# Patient Record
Sex: MALE | Race: WHITE | Employment: FULL TIME | URBAN - METROPOLITAN AREA
[De-identification: names, ages, dates, MRNs, and addresses within clinical notes are randomized per-mention and may not be internally consistent; named-entity substitution may affect disease eponyms.]

---

## 2019-07-10 ENCOUNTER — APPOINTMENT (OUTPATIENT)
Dept: GENERAL RADIOLOGY | Facility: HOSPITAL | Age: 39
End: 2019-07-10
Payer: COMMERCIAL

## 2019-07-10 ENCOUNTER — OFF PREMISE (OUTPATIENT)
Dept: SURGERY | Age: 39
End: 2019-07-10

## 2019-07-10 ENCOUNTER — APPOINTMENT (OUTPATIENT)
Dept: CT IMAGING | Facility: HOSPITAL | Age: 39
End: 2019-07-10
Payer: COMMERCIAL

## 2019-07-10 ENCOUNTER — HOSPITAL (OUTPATIENT)
Dept: OTHER | Age: 39
End: 2019-07-10

## 2019-07-10 ENCOUNTER — HOSPITAL (OUTPATIENT)
Dept: OTHER | Age: 39
End: 2019-07-10
Attending: SURGERY

## 2019-07-10 ENCOUNTER — HOSPITAL ENCOUNTER (EMERGENCY)
Facility: HOSPITAL | Age: 39
Discharge: ANOTHER HOSPITAL | End: 2019-07-10
Payer: COMMERCIAL

## 2019-07-10 VITALS
SYSTOLIC BLOOD PRESSURE: 132 MMHG | OXYGEN SATURATION: 96 % | RESPIRATION RATE: 18 BRPM | HEART RATE: 83 BPM | DIASTOLIC BLOOD PRESSURE: 86 MMHG

## 2019-07-10 DIAGNOSIS — S22.42XA CLOSED FRACTURE OF MULTIPLE RIBS OF LEFT SIDE, INITIAL ENCOUNTER: ICD-10-CM

## 2019-07-10 DIAGNOSIS — S32.401A CLOSED DISPLACED FRACTURE OF RIGHT ACETABULUM, UNSPECIFIED PORTION OF ACETABULUM, INITIAL ENCOUNTER (HCC): Primary | ICD-10-CM

## 2019-07-10 LAB
ALBUMIN SERPL-MCNC: 3.9 G/DL (ref 3.4–5)
ALBUMIN/GLOB SERPL: 1.1 {RATIO} (ref 1–2)
ALP LIVER SERPL-CCNC: 95 U/L (ref 45–117)
ALT SERPL-CCNC: 76 U/L (ref 16–61)
ANALYZER ANC (IANC): ABNORMAL
ANION GAP SERPL CALC-SCNC: 6 MMOL/L (ref 10–20)
ANION GAP SERPL CALC-SCNC: 7 MMOL/L (ref 0–18)
ANTIBODY SCREEN: NEGATIVE
APTT PPP: 24.3 SECONDS (ref 25.4–36.1)
AST SERPL-CCNC: 32 U/L (ref 15–37)
ATRIAL RATE: 74 BPM
BASOPHILS # BLD AUTO: 0.05 X10(3) UL (ref 0–0.2)
BASOPHILS # BLD: 0 K/MCL (ref 0–0.3)
BASOPHILS NFR BLD AUTO: 0.6 %
BASOPHILS NFR BLD: 0 %
BILIRUB SERPL-MCNC: 1.2 MG/DL (ref 0.1–2)
BUN BLD-MCNC: 13 MG/DL (ref 7–18)
BUN SERPL-MCNC: 14 MG/DL (ref 6–20)
BUN/CREAT SERPL: 11.8 (ref 10–20)
BUN/CREAT SERPL: 14 (ref 7–25)
CALCIUM BLD-MCNC: 9.3 MG/DL (ref 8.5–10.1)
CALCIUM SERPL-MCNC: 9 MG/DL (ref 8.4–10.2)
CHLORIDE SERPL-SCNC: 106 MMOL/L (ref 98–112)
CHLORIDE SERPL-SCNC: 107 MMOL/L (ref 98–107)
CO2 SERPL-SCNC: 27 MMOL/L (ref 21–32)
CO2 SERPL-SCNC: 28 MMOL/L (ref 21–32)
CREAT BLD-MCNC: 1.1 MG/DL (ref 0.7–1.3)
CREAT SERPL-MCNC: 1.02 MG/DL (ref 0.67–1.17)
DEPRECATED RDW RBC AUTO: 44 FL (ref 35.1–46.3)
DIFFERENTIAL METHOD BLD: ABNORMAL
EOSINOPHIL # BLD AUTO: 0.19 X10(3) UL (ref 0–0.7)
EOSINOPHIL # BLD: 0.1 K/MCL (ref 0.1–0.5)
EOSINOPHIL NFR BLD AUTO: 2.1 %
EOSINOPHIL NFR BLD: 0 %
ERYTHROCYTE [DISTWIDTH] IN BLOOD BY AUTOMATED COUNT: 12.8 % (ref 11–15)
ERYTHROCYTE [DISTWIDTH] IN BLOOD: 12.9 % (ref 11–15)
ETHANOL SERPL-MCNC: <3 MG/DL (ref ?–3)
GLOBULIN PLAS-MCNC: 3.5 G/DL (ref 2.8–4.4)
GLUCOSE BLD-MCNC: 117 MG/DL (ref 70–99)
GLUCOSE SERPL-MCNC: 99 MG/DL (ref 65–99)
HCT VFR BLD AUTO: 49.3 % (ref 39–53)
HCT VFR BLD CALC: 48.3 % (ref 39–51)
HGB BLD-MCNC: 16.6 G/DL (ref 13–17)
HGB BLD-MCNC: 17.4 G/DL (ref 13–17.5)
IMM GRANULOCYTES # BLD AUTO: 0.09 X10(3) UL (ref 0–1)
IMM GRANULOCYTES # BLD AUTO: 0.1 K/MCL (ref 0–0.2)
IMM GRANULOCYTES NFR BLD: 1 %
IMM GRANULOCYTES NFR BLD: 1 %
INR BLD: 0.95 (ref 0.9–1.1)
LYMPHOCYTES # BLD AUTO: 3.58 X10(3) UL (ref 1–4)
LYMPHOCYTES # BLD: 1.8 K/MCL (ref 1–4.8)
LYMPHOCYTES NFR BLD AUTO: 40.2 %
LYMPHOCYTES NFR BLD: 12 %
M PROTEIN MFR SERPL ELPH: 7.4 G/DL (ref 6.4–8.2)
MCH RBC QN AUTO: 32.7 PG (ref 26–34)
MCH RBC QN AUTO: 33.3 PG (ref 26–34)
MCHC RBC AUTO-ENTMCNC: 34.4 G/DL (ref 32–36.5)
MCHC RBC AUTO-ENTMCNC: 35.3 G/DL (ref 31–37)
MCV RBC AUTO: 94.3 FL (ref 80–100)
MCV RBC AUTO: 95.3 FL (ref 78–100)
MONOCYTES # BLD AUTO: 0.61 X10(3) UL (ref 0.1–1)
MONOCYTES # BLD: 0.8 K/MCL (ref 0.3–0.9)
MONOCYTES NFR BLD AUTO: 6.9 %
MONOCYTES NFR BLD: 6 %
NEUTROPHILS # BLD AUTO: 4.38 X10 (3) UL (ref 1.5–7.7)
NEUTROPHILS # BLD AUTO: 4.38 X10(3) UL (ref 1.5–7.7)
NEUTROPHILS # BLD: 11.4 K/MCL (ref 1.8–7.7)
NEUTROPHILS NFR BLD AUTO: 49.2 %
NEUTROPHILS NFR BLD: 81 %
NEUTS SEG NFR BLD: ABNORMAL %
NRBC (NRBCRE): 0 /100 WBC
OSMOLALITY SERPL CALC.SUM OF ELEC: 291 MOSM/KG (ref 275–295)
P AXIS: 15 DEGREES
P-R INTERVAL: 106 MS
PLATELET # BLD AUTO: 228 10(3)UL (ref 150–450)
PLATELET # BLD: 242 K/MCL (ref 140–450)
POTASSIUM SERPL-SCNC: 4.1 MMOL/L (ref 3.4–5.1)
POTASSIUM SERPL-SCNC: 4.1 MMOL/L (ref 3.5–5.1)
PSA SERPL DL<=0.01 NG/ML-MCNC: 13.1 SECONDS (ref 12.5–14.7)
Q-T INTERVAL: 370 MS
QRS DURATION: 88 MS
QTC CALCULATION (BEZET): 410 MS
R AXIS: 49 DEGREES
RBC # BLD AUTO: 5.23 X10(6)UL (ref 4.3–5.7)
RBC # BLD: 5.07 MIL/MCL (ref 4.5–5.9)
RH BLOOD TYPE: POSITIVE
SODIUM SERPL-SCNC: 137 MMOL/L (ref 135–145)
SODIUM SERPL-SCNC: 140 MMOL/L (ref 136–145)
T AXIS: 49 DEGREES
VENTRICULAR RATE: 74 BPM
WBC # BLD AUTO: 8.9 X10(3) UL (ref 4–11)
WBC # BLD: 14.1 K/MCL (ref 4.2–11)

## 2019-07-10 PROCEDURE — 96361 HYDRATE IV INFUSION ADD-ON: CPT

## 2019-07-10 PROCEDURE — 93005 ELECTROCARDIOGRAM TRACING: CPT

## 2019-07-10 PROCEDURE — 99291 CRITICAL CARE FIRST HOUR: CPT

## 2019-07-10 PROCEDURE — 85730 THROMBOPLASTIN TIME PARTIAL: CPT

## 2019-07-10 PROCEDURE — 96374 THER/PROPH/DIAG INJ IV PUSH: CPT

## 2019-07-10 PROCEDURE — 96376 TX/PRO/DX INJ SAME DRUG ADON: CPT

## 2019-07-10 PROCEDURE — 86900 BLOOD TYPING SEROLOGIC ABO: CPT

## 2019-07-10 PROCEDURE — 86850 RBC ANTIBODY SCREEN: CPT

## 2019-07-10 PROCEDURE — 80053 COMPREHEN METABOLIC PANEL: CPT

## 2019-07-10 PROCEDURE — 70450 CT HEAD/BRAIN W/O DYE: CPT

## 2019-07-10 PROCEDURE — 85025 COMPLETE CBC W/AUTO DIFF WBC: CPT

## 2019-07-10 PROCEDURE — 27226 TREAT HIP WALL FRACTURE: CPT | Performed by: SPECIALIST/TECHNOLOGIST, OTHER

## 2019-07-10 PROCEDURE — 72125 CT NECK SPINE W/O DYE: CPT

## 2019-07-10 PROCEDURE — 74177 CT ABD & PELVIS W/CONTRAST: CPT

## 2019-07-10 PROCEDURE — 93010 ELECTROCARDIOGRAM REPORT: CPT

## 2019-07-10 PROCEDURE — 99221 1ST HOSP IP/OBS SF/LOW 40: CPT

## 2019-07-10 PROCEDURE — 85610 PROTHROMBIN TIME: CPT

## 2019-07-10 PROCEDURE — 86901 BLOOD TYPING SEROLOGIC RH(D): CPT

## 2019-07-10 PROCEDURE — 73501 X-RAY EXAM HIP UNI 1 VIEW: CPT

## 2019-07-10 PROCEDURE — 71260 CT THORAX DX C+: CPT

## 2019-07-10 PROCEDURE — 80320 DRUG SCREEN QUANTALCOHOLS: CPT

## 2019-07-10 PROCEDURE — 96375 TX/PRO/DX INJ NEW DRUG ADDON: CPT

## 2019-07-10 RX ORDER — HYDROMORPHONE HYDROCHLORIDE 1 MG/ML
INJECTION, SOLUTION INTRAMUSCULAR; INTRAVENOUS; SUBCUTANEOUS EVERY 30 MIN PRN
Status: DISCONTINUED | OUTPATIENT
Start: 2019-07-10 | End: 2019-07-10

## 2019-07-10 RX ORDER — ONDANSETRON 2 MG/ML
4 INJECTION INTRAMUSCULAR; INTRAVENOUS ONCE
Status: COMPLETED | OUTPATIENT
Start: 2019-07-10 | End: 2019-07-10

## 2019-07-10 RX ORDER — HYDROMORPHONE HYDROCHLORIDE 1 MG/ML
1 INJECTION, SOLUTION INTRAMUSCULAR; INTRAVENOUS; SUBCUTANEOUS ONCE
Status: COMPLETED | OUTPATIENT
Start: 2019-07-10 | End: 2019-07-10
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
## 2019-07-11 ENCOUNTER — PRIOR ORIGINAL RECORDS (OUTPATIENT)
Dept: OTHER | Age: 39
End: 2019-07-11

## 2019-07-11 ENCOUNTER — OFF PREMISE (OUTPATIENT)
Dept: SURGERY | Age: 39
End: 2019-07-11

## 2019-07-11 LAB
25(OH)D3+25(OH)D2 SERPL-MCNC: 18.5 NG/ML (ref 30–100)
25(OH)D3+25(OH)D2 SERPL-MCNC: ABNORMAL NG/ML
ANALYZER ANC (IANC): ABNORMAL
ANION GAP SERPL CALC-SCNC: 8 MMOL/L (ref 10–20)
BUN SERPL-MCNC: 10 MG/DL (ref 6–20)
BUN/CREAT SERPL: 11 (ref 7–25)
CALCIUM SERPL-MCNC: 8.5 MG/DL (ref 8.4–10.2)
CHLORIDE SERPL-SCNC: 105 MMOL/L (ref 98–107)
CO2 SERPL-SCNC: 27 MMOL/L (ref 21–32)
CREAT SERPL-MCNC: 0.87 MG/DL (ref 0.67–1.17)
ERYTHROCYTE [DISTWIDTH] IN BLOOD: 12.6 % (ref 11–15)
GLUCOSE SERPL-MCNC: 115 MG/DL (ref 65–99)
HCT VFR BLD CALC: 45.2 % (ref 39–51)
HGB BLD-MCNC: 15.5 G/DL (ref 13–17)
MCH RBC QN AUTO: 32.6 PG (ref 26–34)
MCHC RBC AUTO-ENTMCNC: 34.3 G/DL (ref 32–36.5)
MCV RBC AUTO: 95 FL (ref 78–100)
NRBC (NRBCRE): 0 /100 WBC
PLATELET # BLD: 196 K/MCL (ref 140–450)
POTASSIUM SERPL-SCNC: 4.4 MMOL/L (ref 3.4–5.1)
RBC # BLD: 4.76 MIL/MCL (ref 4.5–5.9)
SODIUM SERPL-SCNC: 136 MMOL/L (ref 135–145)
WBC # BLD: 12.5 K/MCL (ref 4.2–11)

## 2019-07-11 PROCEDURE — 99233 SBSQ HOSP IP/OBS HIGH 50: CPT | Performed by: SURGERY

## 2019-07-12 ENCOUNTER — OFF PREMISE (OUTPATIENT)
Dept: SURGERY | Age: 39
End: 2019-07-12

## 2019-07-12 PROCEDURE — 99239 HOSP IP/OBS DSCHRG MGMT >30: CPT | Performed by: SURGERY

## 2019-07-29 ENCOUNTER — TELEPHONE (OUTPATIENT)
Dept: SURGERY | Age: 39
End: 2019-07-29

## 2019-08-07 PROBLEM — S22.32XA LEFT RIB FRACTURE: Status: ACTIVE | Noted: 2019-07-10
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
## 2019-08-07 PROBLEM — S32.401A RIGHT ACETABULAR FRACTURE (CMD): Status: ACTIVE | Noted: 2019-07-10

## 2019-08-08 ENCOUNTER — OFFICE VISIT (OUTPATIENT)
Dept: SURGERY | Age: 39
End: 2019-08-08

## 2019-08-08 VITALS
SYSTOLIC BLOOD PRESSURE: 109 MMHG | HEIGHT: 73 IN | WEIGHT: 230 LBS | HEART RATE: 90 BPM | DIASTOLIC BLOOD PRESSURE: 74 MMHG | BODY MASS INDEX: 30.48 KG/M2

## 2019-08-08 DIAGNOSIS — S32.421D CLOSED DISPLACED FRACTURE OF POSTERIOR WALL OF RIGHT ACETABULUM WITH ROUTINE HEALING, SUBSEQUENT ENCOUNTER: ICD-10-CM

## 2019-08-08 DIAGNOSIS — S22.42XD CLOSED FRACTURE OF MULTIPLE RIBS OF LEFT SIDE WITH ROUTINE HEALING, SUBSEQUENT ENCOUNTER: Primary | ICD-10-CM

## 2019-08-08 PROCEDURE — 99212 OFFICE O/P EST SF 10 MIN: CPT | Performed by: NURSE PRACTITIONER

## 2019-08-08 ASSESSMENT — ENCOUNTER SYMPTOMS
CONSTITUTIONAL NEGATIVE: 1
EYES NEGATIVE: 1
NEUROLOGICAL NEGATIVE: 1
GASTROINTESTINAL NEGATIVE: 1